# Patient Record
Sex: MALE | Race: WHITE | NOT HISPANIC OR LATINO | Employment: FULL TIME | URBAN - METROPOLITAN AREA
[De-identification: names, ages, dates, MRNs, and addresses within clinical notes are randomized per-mention and may not be internally consistent; named-entity substitution may affect disease eponyms.]

---

## 2020-03-16 ENCOUNTER — HOSPITAL ENCOUNTER (OUTPATIENT)
Dept: URGENT CARE | Facility: CLINIC | Age: 20
Discharge: HOME | End: 2020-03-16
Attending: FAMILY MEDICINE

## 2020-03-18 LAB — BACTERIA SPEC AEROBE CULT: NORMAL

## 2020-11-21 ENCOUNTER — HOSPITAL ENCOUNTER (OUTPATIENT)
Dept: URGENT CARE | Facility: CLINIC | Age: 20
Discharge: HOME | End: 2020-11-21
Attending: EMERGENCY MEDICINE

## 2020-11-24 LAB — BACTERIA SPEC AEROBE CULT: NORMAL

## 2020-11-25 LAB — SARS-COV-2 RNA SPEC QL NAA+PROBE: NOT DETECTED

## 2022-01-24 PROCEDURE — 86308 HETEROPHILE ANTIBODY SCREEN: CPT | Performed by: EMERGENCY MEDICINE

## 2023-05-25 PROCEDURE — 87081 CULTURE SCREEN ONLY: CPT | Performed by: NURSE PRACTITIONER

## 2023-08-02 ENCOUNTER — OFFICE VISIT (OUTPATIENT)
Dept: ORTHOPEDIC SURGERY | Facility: CLINIC | Age: 23
End: 2023-08-02
Payer: COMMERCIAL

## 2023-08-02 VITALS
SYSTOLIC BLOOD PRESSURE: 120 MMHG | DIASTOLIC BLOOD PRESSURE: 75 MMHG | WEIGHT: 230 LBS | BODY MASS INDEX: 26.61 KG/M2 | HEIGHT: 78 IN | HEART RATE: 70 BPM | OXYGEN SATURATION: 95 %

## 2023-08-02 DIAGNOSIS — S93.601D SPRAIN OF RIGHT FOOT, SUBSEQUENT ENCOUNTER: Primary | ICD-10-CM

## 2023-08-30 ENCOUNTER — OFFICE VISIT (OUTPATIENT)
Dept: ORTHOPEDIC SURGERY | Facility: CLINIC | Age: 23
End: 2023-08-30
Payer: COMMERCIAL

## 2023-08-30 VITALS
BODY MASS INDEX: 26.61 KG/M2 | DIASTOLIC BLOOD PRESSURE: 74 MMHG | OXYGEN SATURATION: 95 % | HEIGHT: 78 IN | WEIGHT: 230 LBS | SYSTOLIC BLOOD PRESSURE: 156 MMHG | HEART RATE: 75 BPM

## 2023-08-30 DIAGNOSIS — S93.601D SPRAIN OF RIGHT FOOT, SUBSEQUENT ENCOUNTER: Primary | ICD-10-CM

## 2023-08-30 DIAGNOSIS — M79.671 RIGHT FOOT PAIN: ICD-10-CM

## 2023-08-30 NOTE — PROGRESS NOTES
"Chief Complaint  Follow-up of the Right Foot    Subjective          Brady Krishnan presents to [facility]   History of Present Illness    Brady Krishnan presents today for a follow-up of his right foot.  Patient has a right foot sprain with initial injury 6/24/2023.  Today, patient states that he is doing okay.  He has continue with physical therapy.  He reports pain to his foot with ambulation.  The pain remains in the lateral portion.  He states that his foot does continue to gradually improve.  He reports good foot and ankle range of motion, does not feel like he has any limitations from range of motion.  He states that his swelling has been improving.  He denies new injuries.      No Known Allergies     Social History     Socioeconomic History    Marital status: Single   Tobacco Use    Smoking status: Never     Passive exposure: Never    Smokeless tobacco: Never   Vaping Use    Vaping Use: Some days   Substance and Sexual Activity    Alcohol use: Yes     Alcohol/week: 6.0 standard drinks     Types: 4 Cans of beer, 2 Shots of liquor per week     Comment: occ on weekends    Drug use: Never        I reviewed the patient's chief complaint, history of present illness, review of systems, past medical history, surgical history, family history, social history, medications, and allergy list.     REVIEW OF SYSTEMS    Constitutional: Denies fevers, chills, weight loss  Cardiovascular: Denies chest pain, shortness of breath  Skin: Denies rashes, acute skin changes  Neurologic: Denies headache, loss of consciousness  MSK: Right foot pain      Objective   Vital Signs:   /74   Pulse 75   Ht 203.2 cm (80\")   Wt 104 kg (230 lb)   SpO2 95%   BMI 25.27 kg/mý     Body mass index is 25.27 kg/mý.    Physical Exam    General: Alert. No acute distress.   Right lower extremity: Tenderness to palpation of the lateral foot.  No other areas of tenderness to the foot.  No swelling.  Calf soft, " nontender.  Achilles intact.  Nontender to medial lateral malleolus.  Demonstrates active ankle range of motion with no stiffness.  Slight discomfort with inversion testing.  Stiffness with inversion testing.  Toe range of motion intact.  Sensation intact over the dorsal and plantar foot.  Palpable pedal pulses.    Procedures    Imaging Results (Most Recent)       None                   Assessment and Plan    Diagnoses and all orders for this visit:    1. Sprain of right foot, subsequent encounter (Primary)    2. Right foot pain        Brady Krishnan presents today for follow-up of his right foot sprain with initial injury 6/24/2023.  Patient instructed to continue with formal physical therapy as well as his home exercises.  Continue using ice and elevation as needed for inflammation.  Continue with anti-inflammatories as needed.      Patient will follow up in 4 weeks for reevaluation.      Call or return if symptoms worsen or patient has any concerns.       Follow Up   Return in about 4 weeks (around 9/27/2023).  Patient was given instructions and counseling regarding his condition or for health maintenance advice. Please see specific information pulled into the AVS if appropriate.     Delia Mercedes PA-C  08/30/23  12:48 EDT

## 2024-06-13 ENCOUNTER — OFFICE VISIT (OUTPATIENT)
Dept: GASTROENTEROLOGY | Facility: CLINIC | Age: 24
End: 2024-06-13
Payer: COMMERCIAL

## 2024-06-13 VITALS
SYSTOLIC BLOOD PRESSURE: 120 MMHG | BODY MASS INDEX: 27.1 KG/M2 | HEART RATE: 68 BPM | HEIGHT: 78 IN | DIASTOLIC BLOOD PRESSURE: 67 MMHG | WEIGHT: 234.2 LBS

## 2024-06-13 DIAGNOSIS — R10.9 ABDOMINAL CRAMPING: ICD-10-CM

## 2024-06-13 DIAGNOSIS — R15.2 FECAL URGENCY: ICD-10-CM

## 2024-06-13 DIAGNOSIS — R10.13 DYSPEPSIA: ICD-10-CM

## 2024-06-13 DIAGNOSIS — R19.7 DIARRHEA, UNSPECIFIED TYPE: Primary | ICD-10-CM

## 2024-06-13 RX ORDER — FLUTICASONE PROPIONATE 50 MCG
2 SPRAY, SUSPENSION (ML) NASAL DAILY
COMMUNITY

## 2024-06-13 RX ORDER — LEVOCETIRIZINE DIHYDROCHLORIDE 5 MG/1
5 TABLET, FILM COATED ORAL EVERY EVENING
COMMUNITY

## 2024-06-13 NOTE — PROGRESS NOTES
Chief Complaint        Diarrhea    History of Present Illness      Brady Krishnan is a 24 y.o. male who presents to [facility] as a new patient for diarrhea.    He admits in 2023 he started to meal prep and get healthier. In June of 2023 he broke his foot and was unable to meal prep. That's when the diarrhea started becoming a problem. He admits he was eating out a lot. He admits about 3 months ago he started back with meal prepping. The diarrhea is still a problem especially with certain foods. He is having fecal urgency. He admits it has changed his life because he doesn't know when it will hit depending on what he eats. He denies any rectal bleeding or melena. Will have some stomach cramps. He does have a lot of heartburn. He doesn't take anything routine for it. He will sometimes have a solid stool. He is happy to report reflux is better when he eats clean. He does admit anxiety makes his symptoms worse.     Fecal occult blood test done at [facility] this past March was negative.  Celiac panel normal.  Fecal fat normal.    EGD/colonoscopy----never    GI family history----Maternal aunt with colon cancer.     Results       Result Review :   The following data was reviewed by: ARCHIE Javier on 06/13/2024       CBC          7/26/2023    11:27 1/2/2024    09:20   CBC   WBC 6.25     6.10       RBC 4.76     4.68       Hemoglobin 14.9     14.9       Hematocrit 43.2     43.6       MCV 90.8     93.2       MCH 31.3     31.8       MCHC 34.5     34.2       RDW 11.9     12.0       Platelets 198     213          Details          This result is from an external source.             CBC w/diff          7/26/2023    11:27 1/2/2024    09:20   CBC w/Diff   WBC 6.25     6.10       RBC 4.76     4.68       Hemoglobin 14.9     14.9       Hematocrit 43.2     43.6       MCV 90.8     93.2       MCH 31.3     31.8       MCHC 34.5     34.2       RDW 11.9     12.0       Platelets 198     213      "  Neutrophil Rel % 57.4     52.4       Immature Granulocyte Rel % 0.2     0.2       Lymphocyte Rel % 31.7     32.5       Monocyte Rel % 8.2     7.7       Eosinophil Rel % 1.9     6.4       Basophil Rel % 0.6     0.8          Details          This result is from an external source.                   Lipase No results found for: \"LIPASE\"  Amylase No results found for: \"AMYLASE\"  Iron Profile No results found for: \"IRON\", \"TIBC\", \"LABIRON\", \"TRANSFERRIN\"  Ferritin No results found for: \"FERRITIN\"         Past Medical History       Past Medical History:   Diagnosis Date    Allergies     Fracture, foot 06/23/2023       History reviewed. No pertinent surgical history.      Current Outpatient Medications:     fluticasone (FLONASE) 50 MCG/ACT nasal spray, 2 sprays into the nostril(s) as directed by provider Daily., Disp: , Rfl:     levocetirizine (XYZAL) 5 MG tablet, Take 1 tablet by mouth Every Evening., Disp: , Rfl:     ultra-purified peppermint oil (IBGARD) 90 mg capsule capsule, Take 2 capsules by mouth 3 (Three) Times a Day As Needed (prn)., Disp: 16 capsule, Rfl: 0     No Known Allergies    History reviewed. No pertinent family history.     Social History     Social History Narrative    Not on file       Objective       Objective     Vital Signs:   /67 (BP Location: Left arm, Patient Position: Sitting, Cuff Size: Adult)   Pulse 68   Ht 203.2 cm (80\")   Wt 106 kg (234 lb 3.2 oz)   BMI 25.73 kg/m²     Body mass index is 25.73 kg/m².    Review of Systems   Constitutional:  Negative for appetite change, chills, diaphoresis, fatigue, fever and unexpected weight change.   HENT:  Negative for nosebleeds, postnasal drip, sore throat, trouble swallowing and voice change.    Respiratory:  Negative for cough, choking, chest tightness, shortness of breath, wheezing and stridor.    Cardiovascular:  Negative for chest pain, palpitations and leg swelling.   Gastrointestinal:  Positive for abdominal distention and diarrhea. " Negative for abdominal pain, anal bleeding, blood in stool, constipation, nausea, rectal pain and vomiting.   Endocrine: Negative for polydipsia, polyphagia and polyuria.   Musculoskeletal:  Negative for gait problem.   Skin:  Negative for rash and wound.   Allergic/Immunologic: Negative for food allergies.   Neurological:  Negative for dizziness, speech difficulty and light-headedness.   Psychiatric/Behavioral:  Negative for confusion, self-injury, sleep disturbance and suicidal ideas.         Physical Exam  Constitutional:       General: He is not in acute distress.     Appearance: He is well-developed. He is not ill-appearing.   HENT:      Head: Normocephalic.   Eyes:      Pupils: Pupils are equal, round, and reactive to light.   Cardiovascular:      Rate and Rhythm: Normal rate and regular rhythm.      Heart sounds: Normal heart sounds.   Pulmonary:      Effort: Pulmonary effort is normal.      Breath sounds: Normal breath sounds.   Abdominal:      General: Bowel sounds are normal. There is no distension.      Palpations: Abdomen is soft. There is no mass.      Tenderness: There is no abdominal tenderness. There is no guarding or rebound.      Hernia: No hernia is present.   Musculoskeletal:         General: Normal range of motion.   Skin:     General: Skin is warm and dry.   Neurological:      Mental Status: He is alert and oriented to person, place, and time.   Psychiatric:         Speech: Speech normal.         Behavior: Behavior normal.         Judgment: Judgment normal.              Assessment & Plan          Assessment and Plan    Diagnoses and all orders for this visit:    1. Diarrhea, unspecified type (Primary)  -     Alpha-Gal IgE Panel; Future  -     Food Allergy Profile; Future  -     Lipase  -     US Gallbladder; Future  -     ultra-purified peppermint oil (IBGARD) 90 mg capsule capsule; Take 2 capsules by mouth 3 (Three) Times a Day As Needed (prn).  Dispense: 16 capsule; Refill: 0    2.  Dyspepsia  -     Alpha-Gal IgE Panel; Future  -     Food Allergy Profile; Future  -     Lipase  -     US Gallbladder; Future    3. Fecal urgency    4. Abdominal cramping  -     ultra-purified peppermint oil (IBGARD) 90 mg capsule capsule; Take 2 capsules by mouth 3 (Three) Times a Day As Needed (prn).  Dispense: 16 capsule; Refill: 0    Reviewed medical history with him today.  Not sure what all he has going on at this time.  It does sound like anxiety plays a part in it.  I am concerned about his gallbladder.  Will check gallbladder ultrasound.  Will check labs.  Will start him on IBgard.  Samples given today.  Continue bland diet.  Patient to call the office in 1 to 2 weeks with an update.  Patient to follow-up with me in 3 months.  Patient is agreeable to the plan.            Follow Up       Follow Up   Return in about 3 months (around 9/13/2024) for DIARRHEA, GERD.  Patient was given instructions and counseling regarding his condition or for health maintenance advice. Please see specific information pulled into the AVS if appropriate.

## 2024-06-20 ENCOUNTER — LAB (OUTPATIENT)
Dept: LAB | Facility: HOSPITAL | Age: 24
End: 2024-06-20
Payer: COMMERCIAL

## 2024-06-20 DIAGNOSIS — R19.7 DIARRHEA, UNSPECIFIED TYPE: ICD-10-CM

## 2024-06-20 DIAGNOSIS — R10.13 DYSPEPSIA: ICD-10-CM

## 2024-06-20 LAB — LIPASE SERPL-CCNC: 32 U/L (ref 13–60)

## 2024-06-20 PROCEDURE — 86003 ALLG SPEC IGE CRUDE XTRC EA: CPT

## 2024-06-20 PROCEDURE — 86008 ALLG SPEC IGE RECOMB EA: CPT

## 2024-06-20 PROCEDURE — 83690 ASSAY OF LIPASE: CPT | Performed by: NURSE PRACTITIONER

## 2024-06-20 PROCEDURE — 36415 COLL VENOUS BLD VENIPUNCTURE: CPT

## 2024-06-20 PROCEDURE — 82785 ASSAY OF IGE: CPT

## 2024-06-25 LAB
CLAM IGE QN: <0.1 KU/L
CODFISH IGE QN: <0.1 KU/L
CONV CLASS DESCRIPTION: ABNORMAL
CORN IGE QN: 1.08 KU/L
COW MILK IGE QN: <0.1 KU/L
EGG WHITE IGE QN: <0.1 KU/L
PEANUT IGE QN: 2.2 KU/L
SCALLOP IGE QN: <0.1 KU/L
SESAME SEED IGE QN: 2 KU/L
SHRIMP IGE QN: <0.1 KU/L
SOYBEAN IGE QN: 0.29 KU/L
WALNUT IGE QN: 0.66 KU/L
WHEAT IGE QN: 0.93 KU/L

## 2024-06-26 ENCOUNTER — TELEPHONE (OUTPATIENT)
Dept: GASTROENTEROLOGY | Facility: CLINIC | Age: 24
End: 2024-06-26
Payer: COMMERCIAL

## 2024-06-26 LAB
ALPHA-GAL IGE QN: <0.1 KU/L
BEEF IGE QN: <0.1 KU/L
CONV CLASS DESCRIPTION: NORMAL
IGE SERPL-ACNC: 36 IU/ML (ref 6–495)
LAMB IGE QN: <0.1 KU/L
PORK IGE QN: <0.1 KU/L

## 2024-06-26 NOTE — TELEPHONE ENCOUNTER
----- Message from Hyun Aguilar sent at 6/26/2024  1:12 PM EDT -----  Please call the patient and let him know he had multiple food allergies pop up on his food allergy profile.  Including peanuts, soy beans, walnuts, Wheat, corn and sesame seed.  Would highly recommend that he follow-up with an allergist for further testing.  Alpha gal was normal.

## 2024-07-10 ENCOUNTER — HOSPITAL ENCOUNTER (OUTPATIENT)
Dept: ULTRASOUND IMAGING | Facility: HOSPITAL | Age: 24
Discharge: HOME | End: 2024-07-10
Admitting: NURSE PRACTITIONER
Payer: COMMERCIAL

## 2024-07-10 DIAGNOSIS — R10.13 DYSPEPSIA: ICD-10-CM

## 2024-07-10 DIAGNOSIS — R19.7 DIARRHEA, UNSPECIFIED TYPE: ICD-10-CM

## 2024-07-10 PROCEDURE — 76705 ECHO EXAM OF ABDOMEN: CPT

## 2024-07-11 ENCOUNTER — TELEPHONE (OUTPATIENT)
Dept: GASTROENTEROLOGY | Facility: CLINIC | Age: 24
End: 2024-07-11
Payer: COMMERCIAL

## 2024-07-11 NOTE — TELEPHONE ENCOUNTER
----- Message from Hyun Aguilar sent at 7/11/2024  9:25 AM EDT -----  Gallbladder ultrasound was normal.

## 2024-07-12 NOTE — TELEPHONE ENCOUNTER
Patient was notified of his results and verbalized understanding. Patient stated that he will be out of town on original follow up appointment 9/18 and needed to reschedule. Patient was rescheduled to 10/2/2024 at 10am.

## 2024-11-11 ENCOUNTER — OFFICE VISIT (OUTPATIENT)
Dept: GASTROENTEROLOGY | Facility: CLINIC | Age: 24
End: 2024-11-11
Payer: COMMERCIAL

## 2024-11-11 VITALS
HEART RATE: 80 BPM | BODY MASS INDEX: 25.82 KG/M2 | DIASTOLIC BLOOD PRESSURE: 58 MMHG | WEIGHT: 223.2 LBS | HEIGHT: 78 IN | SYSTOLIC BLOOD PRESSURE: 132 MMHG

## 2024-11-11 DIAGNOSIS — R10.13 DYSPEPSIA: Primary | ICD-10-CM

## 2024-11-11 DIAGNOSIS — R10.30 LOWER ABDOMINAL PAIN: ICD-10-CM

## 2024-11-11 DIAGNOSIS — Z83.719 FH: COLON POLYPS: ICD-10-CM

## 2024-11-11 DIAGNOSIS — Z80.0 FH: COLON CANCER: ICD-10-CM

## 2024-11-11 PROCEDURE — 99214 OFFICE O/P EST MOD 30 MIN: CPT | Performed by: NURSE PRACTITIONER

## 2024-11-11 RX ORDER — SODIUM, POTASSIUM,MAG SULFATES 17.5-3.13G
2 SOLUTION, RECONSTITUTED, ORAL ORAL TAKE AS DIRECTED
Qty: 177 ML | Refills: 0 | Status: SHIPPED | OUTPATIENT
Start: 2024-11-11

## 2024-11-11 NOTE — PROGRESS NOTES
Chief Complaint   Heartburn and Diarrhea    History of Present Illness       Brady Krishnan is a 24 y.o. male who presents to [facility] for follow-up diarrhea.  He was last seen in the office by me on 6/13/2024.    He admits in 2023 he started to meal prep and get healthier. In June of 2023 he broke his foot and was unable to meal prep. That's when the diarrhea started becoming a problem. He admits he was eating out a lot. He admits about 3 months ago he started back with meal prepping. The diarrhea is still a problem especially with certain foods. He is having fecal urgency. He admits it has changed his life because he doesn't know when it will hit depending on what he eats. He denies any rectal bleeding or melena. Will have some stomach cramps. He does have a lot of heartburn. He doesn't take anything routine for it. He will sometimes have a solid stool. He is happy to report reflux is better when he eats clean. He does admit anxiety makes his symptoms worse.      Fecal occult blood test done at [facility] this past March was negative.  Celiac panel normal.  Fecal fat normal.     EGD/colonoscopy----never     GI family history----Maternal aunt with colon cancer. Mother colon polyps.     He had a gallbladder ultrasound done on 7/10/2024 that was completely normal.    Still having diarrhea. Will have solid stools but diarrhea comes and goes. He meal preps for the week and so far he's better avoiding soy. Has also changed protein powder and that has helped. Food allergy panel did show several allergies. Alpha gal normal. Celiac normal. Didn't see any relief from the IBGARD. Feels anxiety could be an issue.      Results       Result Review :         CBC          1/2/2024    09:20   CBC   WBC 6.10       RBC 4.68       Hemoglobin 14.9       Hematocrit 43.6       MCV 93.2       MCH 31.8       MCHC 34.2       RDW 12.0       Platelets 213          Details          This result is from an  "external source.             CBC w/diff          1/2/2024    09:20   CBC w/Diff   WBC 6.10       RBC 4.68       Hemoglobin 14.9       Hematocrit 43.6       MCV 93.2       MCH 31.8       MCHC 34.2       RDW 12.0       Platelets 213       Neutrophil Rel % 52.4       Immature Granulocyte Rel % 0.2       Lymphocyte Rel % 32.5       Monocyte Rel % 7.7       Eosinophil Rel % 6.4       Basophil Rel % 0.8          Details          This result is from an external source.                     Lipase   Lipase   Date Value Ref Range Status   06/20/2024 32 13 - 60 U/L Final     Amylase No results found for: \"AMYLASE\"  Iron Profile No results found for: \"IRON\", \"TIBC\", \"LABIRON\", \"TRANSFERRIN\"  Ferritin No results found for: \"FERRITIN\"  ESR (Sed Rate) No results found for: \"SEDRATE\"  CRP (C-Reactive) No results found for: \"CRP\"  Liver Workup No results found for: \"AFPTM\", \"DSDNA\", \"EXPANDEDENA\", \"SMOOTHMUSCAB\", \"CERULOPLSM\", \"FERRITIN\", \"LABIMMURE\", \"TOTIGGREF\", \"IGA\", \"IGM\", \"IRON\", \"TIBC\", \"LABIRON\", \"TRANSFERRIN\", \"MITOAB\", \"PROTIME\", \"INR\", \"AFP\"            Past Medical History       Past Medical History:   Diagnosis Date    Allergies     Fracture, foot 06/23/2023       History reviewed. No pertinent surgical history.      Current Outpatient Medications:     fluticasone (FLONASE) 50 MCG/ACT nasal spray, Administer 2 sprays into the nostril(s) as directed by provider Daily., Disp: , Rfl:     levocetirizine (XYZAL) 5 MG tablet, Take 1 tablet by mouth Every Evening., Disp: , Rfl:     sodium-potassium-magnesium sulfates (Suprep Bowel Prep Kit) 17.5-3.13-1.6 GM/177ML solution oral solution, Take 2 bottles by mouth Take As Directed., Disp: 177 mL, Rfl: 0     No Known Allergies    History reviewed. No pertinent family history.     Social History     Social History Narrative    Not on file       Objective       Review of Systems   Constitutional:  Negative for appetite change, fatigue, fever, unexpected weight gain and unexpected " "weight loss.   HENT:  Negative for trouble swallowing.    Respiratory:  Negative for cough, choking, chest tightness, shortness of breath, wheezing and stridor.    Cardiovascular:  Negative for chest pain, palpitations and leg swelling.   Gastrointestinal:  Positive for abdominal pain, diarrhea, nausea, GERD and indigestion. Negative for abdominal distention, anal bleeding, blood in stool, constipation, rectal pain and vomiting.        Vital Signs:   /58 (BP Location: Right arm, Patient Position: Sitting, Cuff Size: Adult)   Pulse 80   Ht 203.2 cm (80\")   Wt 101 kg (223 lb 3.2 oz)   BMI 24.52 kg/m²       Physical Exam  Constitutional:       General: He is not in acute distress.     Appearance: He is well-developed. He is not ill-appearing.   HENT:      Head: Normocephalic.   Eyes:      Pupils: Pupils are equal, round, and reactive to light.   Cardiovascular:      Rate and Rhythm: Normal rate and regular rhythm.      Heart sounds: Normal heart sounds.   Pulmonary:      Effort: Pulmonary effort is normal.      Breath sounds: Normal breath sounds.   Abdominal:      General: Bowel sounds are normal. There is no distension.      Palpations: Abdomen is soft. There is no mass.      Tenderness: There is no abdominal tenderness. There is no guarding or rebound.      Hernia: No hernia is present.   Musculoskeletal:         General: Normal range of motion.   Skin:     General: Skin is warm and dry.   Neurological:      Mental Status: He is alert and oriented to person, place, and time.   Psychiatric:         Speech: Speech normal.         Behavior: Behavior normal.         Judgment: Judgment normal.           Assessment & Plan          Assessment and Plan    Diagnoses and all orders for this visit:    1. Dyspepsia (Primary)  -     NM HIDA Scan With Pharmacological Intervention; Future  -     Case Request; Standing  -     Follow Anesthesia Guidelines / Protocol; Future  -     Case Request  -     " sodium-potassium-magnesium sulfates (Suprep Bowel Prep Kit) 17.5-3.13-1.6 GM/177ML solution oral solution; Take 2 bottles by mouth Take As Directed.  Dispense: 177 mL; Refill: 0    2. Lower abdominal pain  -     NM HIDA Scan With Pharmacological Intervention; Future  -     Case Request; Standing  -     Follow Anesthesia Guidelines / Protocol; Future  -     Case Request  -     sodium-potassium-magnesium sulfates (Suprep Bowel Prep Kit) 17.5-3.13-1.6 GM/177ML solution oral solution; Take 2 bottles by mouth Take As Directed.  Dispense: 177 mL; Refill: 0    3. FH: colon cancer  -     NM HIDA Scan With Pharmacological Intervention; Future  -     Case Request; Standing  -     Follow Anesthesia Guidelines / Protocol; Future  -     Case Request  -     sodium-potassium-magnesium sulfates (Suprep Bowel Prep Kit) 17.5-3.13-1.6 GM/177ML solution oral solution; Take 2 bottles by mouth Take As Directed.  Dispense: 177 mL; Refill: 0    4. FH: colon polyps  -     NM HIDA Scan With Pharmacological Intervention; Future  -     Case Request; Standing  -     Follow Anesthesia Guidelines / Protocol; Future  -     Case Request  -     sodium-potassium-magnesium sulfates (Suprep Bowel Prep Kit) 17.5-3.13-1.6 GM/177ML solution oral solution; Take 2 bottles by mouth Take As Directed.  Dispense: 177 mL; Refill: 0    Other orders  -     Verify NPO; Standing  -     Verify Bowel Prep Was Successful; Standing  -     Give Tap Water Enema If Bowel Prep Insufficient; Standing      Reviewed most recent lab and imaging results with him today.  Gallbladder ultrasound was normal.  Since he is continuing to have symptoms we will check a HIDA scan to fully evaluate his gallbladder.  Still having diarrhea.  Still having reflux symptoms with abdominal pain and cramping.  Given his history and current symptoms recommend EGD and colonoscopy with Dr. Mcdermott for further evaluation.  Patient is agreeable to the scopes.  No blood thinners, no clearances.   Suprep.  IBgard was not helpful.  Food allergy profile did show multiple food allergies.  Alpha gal was normal.  Patient to continue to avoid those food triggers.  Patient to call the office with any issues.  Patient to follow-up with me after his scopes.  Patient is agreeable to the plan.    Surgical Risk and Benefits discussed: Possible risks/complications, benefits, and alternatives to surgical or invasive procedure have been explained to patient and/or legal guardian; risks include bleeding, infection, and perforation. Patient has been evaluated and can tolerate anesthesia and/or sedation. Risks, benefits, and alternatives to anesthesia and sedation have been explained to patient and/or legal guardian.          Follow Up       Follow Up   Return for F/U AFTER PROCEDURE.  Patient was given instructions and counseling regarding his condition or for health maintenance advice. Please see specific information pulled into the AVS if appropriate.

## 2024-12-11 ENCOUNTER — HOSPITAL ENCOUNTER (OUTPATIENT)
Dept: NUCLEAR MEDICINE | Facility: HOSPITAL | Age: 24
Discharge: HOME | End: 2024-12-11
Payer: COMMERCIAL

## 2024-12-11 DIAGNOSIS — Z80.0 FH: COLON CANCER: ICD-10-CM

## 2024-12-11 DIAGNOSIS — R10.13 DYSPEPSIA: ICD-10-CM

## 2024-12-11 DIAGNOSIS — R10.30 LOWER ABDOMINAL PAIN: ICD-10-CM

## 2024-12-11 DIAGNOSIS — Z83.719 FH: COLON POLYPS: ICD-10-CM

## 2024-12-11 PROCEDURE — 34310000005 TECHNETIUM TC 99M MEBROFENIN KIT: Performed by: NURSE PRACTITIONER

## 2024-12-11 PROCEDURE — 78227 HEPATOBIL SYST IMAGE W/DRUG: CPT

## 2024-12-11 PROCEDURE — A9537 TC99M MEBROFENIN: HCPCS | Performed by: NURSE PRACTITIONER

## 2024-12-11 RX ORDER — KIT FOR THE PREPARATION OF TECHNETIUM TC 99M MEBROFENIN 45 MG/10ML
1 INJECTION, POWDER, LYOPHILIZED, FOR SOLUTION INTRAVENOUS
Status: COMPLETED | OUTPATIENT
Start: 2024-12-11 | End: 2024-12-11

## 2024-12-11 RX ADMIN — MEBROFENIN 1 DOSE: 45 INJECTION, POWDER, LYOPHILIZED, FOR SOLUTION INTRAVENOUS at 12:20

## 2024-12-12 ENCOUNTER — TELEPHONE (OUTPATIENT)
Dept: GASTROENTEROLOGY | Facility: CLINIC | Age: 24
End: 2024-12-12
Payer: COMMERCIAL

## 2024-12-13 NOTE — PRE-PROCEDURE INSTRUCTIONS
"PAT call attempted.  No answer.  Detailed message with date and arrival time of 1100 given.  Instructed that arrival time is not procedure time but allows time to prepare for procedure. Come to entrance \"C\"; must have adult  for transportation home; may have two visitors; however, children under 12 must remain in waiting area; instructed on diet/clear liquids/NPO/bowel prep, if needed; may take normal meds two hours prior to arrival time except for blood thinners, antidiabetics, diuretics, and weight loss meds.  Instructed to return call to confirm receipt of instructions and for any questions.  "

## 2024-12-18 ENCOUNTER — ANESTHESIA EVENT (OUTPATIENT)
Dept: GASTROENTEROLOGY | Facility: HOSPITAL | Age: 24
End: 2024-12-18
Payer: COMMERCIAL

## 2024-12-18 NOTE — ANESTHESIA PREPROCEDURE EVALUATION
Anesthesia Evaluation     Patient summary reviewed and Nursing notes reviewed   NPO Solid Status: > 8 hours  NPO Liquid Status: > 2 hours           Airway   Mallampati: I  TM distance: >3 FB  Neck ROM: full  No difficulty expected  Dental - normal exam     Pulmonary - normal exam    breath sounds clear to auscultation  Cardiovascular - normal exam  Exercise tolerance: good (4-7 METS)    Rhythm: regular  Rate: normal        Neuro/Psych  GI/Hepatic/Renal/Endo      Musculoskeletal     Abdominal    Substance History      OB/GYN          Other          Other Comment: SEASONAL ALLERGIES    ROS/Med Hx Other: Dyspepsia, lower abd pain     FAMILY HX COLON CA    No EKG on file                    Anesthesia Plan    ASA 1     general   total IV anesthesia  (Total IV Anesthesia    Patient understands anesthesia not responsible for dental damage.      Discussed risks with pt including aspiration, allergic reactions, apnea, advanced airway placement. Pt verbalized understanding. All questions answered.     )  intravenous induction     Anesthetic plan, risks, benefits, and alternatives have been provided, discussed and informed consent has been obtained with: patient.  Pre-procedure education provided  Plan discussed with CRNA.      CODE STATUS:

## 2024-12-19 ENCOUNTER — HOSPITAL ENCOUNTER (OUTPATIENT)
Facility: HOSPITAL | Age: 24
Setting detail: HOSPITAL OUTPATIENT SURGERY
Discharge: HOME | End: 2024-12-19
Attending: INTERNAL MEDICINE | Admitting: INTERNAL MEDICINE
Payer: COMMERCIAL

## 2024-12-19 ENCOUNTER — ANESTHESIA (OUTPATIENT)
Dept: GASTROENTEROLOGY | Facility: HOSPITAL | Age: 24
End: 2024-12-19
Payer: COMMERCIAL

## 2024-12-19 VITALS
BODY MASS INDEX: 23.3 KG/M2 | WEIGHT: 212.08 LBS | TEMPERATURE: 97.8 F | DIASTOLIC BLOOD PRESSURE: 42 MMHG | RESPIRATION RATE: 10 BRPM | OXYGEN SATURATION: 96 % | HEART RATE: 86 BPM | SYSTOLIC BLOOD PRESSURE: 110 MMHG

## 2024-12-19 DIAGNOSIS — Z83.719 FH: COLON POLYPS: ICD-10-CM

## 2024-12-19 DIAGNOSIS — R10.30 LOWER ABDOMINAL PAIN: ICD-10-CM

## 2024-12-19 DIAGNOSIS — Z80.0 FH: COLON CANCER: ICD-10-CM

## 2024-12-19 DIAGNOSIS — R10.13 DYSPEPSIA: ICD-10-CM

## 2024-12-19 PROCEDURE — 25810000003 LACTATED RINGERS PER 1000 ML

## 2024-12-19 PROCEDURE — 25010000002 LIDOCAINE PF 2% 2 % SOLUTION

## 2024-12-19 PROCEDURE — 25010000002 PROPOFOL 10 MG/ML EMULSION

## 2024-12-19 PROCEDURE — 88305 TISSUE EXAM BY PATHOLOGIST: CPT | Performed by: INTERNAL MEDICINE

## 2024-12-19 DEVICE — DEV CLIP ENDO RESOLUTION360 CONTRL ROT 235CM: Type: IMPLANTABLE DEVICE | Site: COLON | Status: FUNCTIONAL

## 2024-12-19 RX ORDER — LIDOCAINE HYDROCHLORIDE 20 MG/ML
INJECTION, SOLUTION EPIDURAL; INFILTRATION; INTRACAUDAL; PERINEURAL AS NEEDED
Status: DISCONTINUED | OUTPATIENT
Start: 2024-12-19 | End: 2024-12-19 | Stop reason: SURG

## 2024-12-19 RX ORDER — SODIUM CHLORIDE, SODIUM LACTATE, POTASSIUM CHLORIDE, CALCIUM CHLORIDE 600; 310; 30; 20 MG/100ML; MG/100ML; MG/100ML; MG/100ML
30 INJECTION, SOLUTION INTRAVENOUS CONTINUOUS
Status: DISCONTINUED | OUTPATIENT
Start: 2024-12-19 | End: 2024-12-19 | Stop reason: HOSPADM

## 2024-12-19 RX ORDER — PROPOFOL 10 MG/ML
VIAL (ML) INTRAVENOUS AS NEEDED
Status: DISCONTINUED | OUTPATIENT
Start: 2024-12-19 | End: 2024-12-19 | Stop reason: SURG

## 2024-12-19 RX ORDER — DEXMEDETOMIDINE HYDROCHLORIDE 100 UG/ML
INJECTION, SOLUTION INTRAVENOUS AS NEEDED
Status: DISCONTINUED | OUTPATIENT
Start: 2024-12-19 | End: 2024-12-19 | Stop reason: SURG

## 2024-12-19 RX ADMIN — PROPOFOL 50 MG: 10 INJECTION, EMULSION INTRAVENOUS at 13:03

## 2024-12-19 RX ADMIN — PROPOFOL 50 MG: 10 INJECTION, EMULSION INTRAVENOUS at 13:02

## 2024-12-19 RX ADMIN — PROPOFOL 175 MCG/KG/MIN: 10 INJECTION, EMULSION INTRAVENOUS at 13:00

## 2024-12-19 RX ADMIN — DEXMEDETOMIDINE HYDROCHLORIDE 15 MCG: 100 INJECTION, SOLUTION, CONCENTRATE INTRAVENOUS at 13:04

## 2024-12-19 RX ADMIN — LIDOCAINE HYDROCHLORIDE 50 MG: 20 INJECTION, SOLUTION INTRAVENOUS at 13:03

## 2024-12-19 RX ADMIN — PROPOFOL 120 MG: 10 INJECTION, EMULSION INTRAVENOUS at 13:00

## 2024-12-19 RX ADMIN — DEXMEDETOMIDINE HYDROCHLORIDE 15 MCG: 100 INJECTION, SOLUTION, CONCENTRATE INTRAVENOUS at 13:07

## 2024-12-19 RX ADMIN — PROPOFOL 40 MG: 10 INJECTION, EMULSION INTRAVENOUS at 13:21

## 2024-12-19 RX ADMIN — SODIUM CHLORIDE, POTASSIUM CHLORIDE, SODIUM LACTATE AND CALCIUM CHLORIDE 30 ML/HR: 600; 310; 30; 20 INJECTION, SOLUTION INTRAVENOUS at 13:53

## 2024-12-19 RX ADMIN — LIDOCAINE HYDROCHLORIDE 50 MG: 20 INJECTION, SOLUTION INTRAVENOUS at 13:00

## 2024-12-19 RX ADMIN — PROPOFOL 50 MG: 10 INJECTION, EMULSION INTRAVENOUS at 13:01

## 2024-12-19 NOTE — ANESTHESIA POSTPROCEDURE EVALUATION
Patient: Brady Krishnan    Procedure Summary       Date: 12/19/24 Room / Location: [facility] ENDOSCOPY 3 / [facility] ENDOSCOPY    Anesthesia Start: 1257 Anesthesia Stop: 1333    Procedures:       ESOPHAGOGASTRODUODENOSCOPY WITH BIOPSIES      COLONOSCOPY WITH BIOPSIES, WITH COLD SNARE POLYPECTOMY Diagnosis:       Dyspepsia      Lower abdominal pain      FH: colon cancer      FH: colon polyps      (Dyspepsia [R10.13])      (Lower abdominal pain [R10.30])      (FH: colon cancer [Z80.0])      (FH: colon polyps [Z83.719])    Surgeons: Isamar Mcdermott MD Provider: Jonatan García CRNA    Anesthesia Type: general ASA Status: 1            Anesthesia Type: general    Vitals  Vitals Value Taken Time   /42 12/19/24 1409   Temp 36.6 °C (97.8 °F) 12/19/24 1347   Pulse 84 12/19/24 1416   Resp 10 12/19/24 1407   SpO2 96 % 12/19/24 1416   Vitals shown include unfiled device data.        Post Anesthesia Care and Evaluation    Patient location during evaluation: bedside  Patient participation: complete - patient participated  Level of consciousness: awake  Pain management: adequate    Airway patency: patent  Anesthetic complications: No anesthetic complications  PONV Status: controlled  Cardiovascular status: acceptable and stable  Respiratory status: acceptable

## 2024-12-19 NOTE — ANESTHESIA POSTPROCEDURE EVALUATION
Patient: Brady Krishnan    Procedure Summary       Date: 12/19/24 Room / Location: [facility] ENDOSCOPY 3 / [facility] ENDOSCOPY    Anesthesia Start: 1257 Anesthesia Stop: 1333    Procedures:       ESOPHAGOGASTRODUODENOSCOPY WITH BIOPSIES      COLONOSCOPY WITH BIOPSIES, WITH COLD SNARE POLYPECTOMY Diagnosis:       Dyspepsia      Lower abdominal pain      FH: colon cancer      FH: colon polyps      (Dyspepsia [R10.13])      (Lower abdominal pain [R10.30])      (FH: colon cancer [Z80.0])      (FH: colon polyps [Z83.719])    Surgeons: Isamar Mcdermott MD Provider: Jonatan García CRNA    Anesthesia Type: general ASA Status: 1            Anesthesia Type: general    Vitals  Vitals Value Taken Time   BP 84/43 12/19/24 1347   Temp 36.6 °C (97.8 °F) 12/19/24 1347   Pulse 92 12/19/24 1347   Resp 17 12/19/24 1347   SpO2 94 % 12/19/24 1347           Post Anesthesia Care and Evaluation      Comments: Pt is hypovolemic in postop and remains hypotensive , will give 1 liter fluid bolus of LR - otherwise stable and other VS are within normal limits

## 2024-12-19 NOTE — H&P
Pre Procedure History & Physical    Chief Complaint:   Dyspepsia, lower abd pain, diarrhea    Subjective     HPI:   23 yo M here for eval of dyspepsia, lower abd pain, diarrhea.    Past Medical History:   Past Medical History:   Diagnosis Date    Allergies     Fracture, foot 06/23/2023       Past Surgical History:  History reviewed. No pertinent surgical history.    Family History:  History reviewed. No pertinent family history.    Social History:   reports that he has never smoked. He has never been exposed to tobacco smoke. He has never used smokeless tobacco. He reports that he does not currently use alcohol after a past usage of about 6.0 standard drinks of alcohol per week. He reports that he does not use drugs.    Medications:   Medications Prior to Admission   Medication Sig Dispense Refill Last Dose/Taking    fluticasone (FLONASE) 50 MCG/ACT nasal spray Administer 2 sprays into the nostril(s) as directed by provider Daily.       levocetirizine (XYZAL) 5 MG tablet Take 1 tablet by mouth Every Evening.          Allergies:  Patient has no known allergies.    ROS:    Pertinent items are noted in HPI     Objective     Blood pressure 135/93, pulse 110, temperature 98.6 °F (37 °C), temperature source Temporal, resp. rate 15, weight 96.2 kg (212 lb 1.3 oz), SpO2 98%.    Physical Exam   Constitutional: Pt is oriented to person, place, and time and well-developed, well-nourished, and in no distress.   Mouth/Throat: Oropharynx is clear and moist.   Neck: Normal range of motion.   Cardiovascular: Normal rate, regular rhythm and normal heart sounds.    Pulmonary/Chest: Effort normal and breath sounds normal.   Abdominal: Soft. Nontender  Skin: Skin is warm and dry.   Psychiatric: Mood, memory, affect and judgment normal.     Assessment & Plan     Diagnosis:  Dyspepsia, lower abd pain, diarrhea    Anticipated Surgical Procedure:  EGD/colonoscopy    The risks, benefits, and alternatives of this procedure have been  discussed with the patient or the responsible party- the patient understands and agrees to proceed.

## 2024-12-23 LAB
CYTO UR: NORMAL
LAB AP CASE REPORT: NORMAL
LAB AP CLINICAL INFORMATION: NORMAL
PATH REPORT.FINAL DX SPEC: NORMAL
PATH REPORT.GROSS SPEC: NORMAL

## 2024-12-23 RX ORDER — PANTOPRAZOLE SODIUM 20 MG/1
20 TABLET, DELAYED RELEASE ORAL DAILY
Qty: 90 TABLET | Refills: 1 | Status: SHIPPED | OUTPATIENT
Start: 2024-12-23

## 2024-12-27 ENCOUNTER — TELEPHONE (OUTPATIENT)
Dept: GASTROENTEROLOGY | Facility: CLINIC | Age: 24
End: 2024-12-27
Payer: COMMERCIAL

## 2024-12-27 NOTE — TELEPHONE ENCOUNTER
Attempted to contact patient, left voicemail message to return call. Provided direct contact information.    Reminded of follow up with ARCHIE Suh on 01.23.25 at 1345.    Care Gap updated:  5 year colon recall placed

## 2024-12-27 NOTE — TELEPHONE ENCOUNTER
----- Message from Maria Teresa Paige sent at 12/23/2024 12:05 PM EST -----  Esophageal biopsies are consistent with eosinophilic esophagitis. This is often referred to as EOE and remains that there are eosinophils or allergy cells in the esophagus. This can cause inflammation and difficulty in swallowing. Recommend PPI (protonix sent).  Keep scheduled follow-up.  Colon polyp benign.  Place in recall for repeat colonoscopy in 5 years.

## 2024-12-27 NOTE — TELEPHONE ENCOUNTER
Patient returned call:    Spoke to patient and informed of ARCHIE Suh result note and recommendations. Verified patient understanding.    Confirmed patient is taking pantoprazole. Educated on why and how best to take PPI medications.      Educated on lifestyle modifications to help decrease acid reflux including:  losing weight, avoiding trigger foods, avoid laying down within 3 hours of eating and elevating HOB.    Confirmed follow up appointment with Hyun Aguilar on 01.23.25 at 1345.    Advised patient to reach out to our office with any GI needs.

## 2025-01-23 ENCOUNTER — OFFICE VISIT (OUTPATIENT)
Dept: GASTROENTEROLOGY | Facility: CLINIC | Age: 25
End: 2025-01-23
Payer: COMMERCIAL

## 2025-01-23 VITALS
BODY MASS INDEX: 25.92 KG/M2 | DIASTOLIC BLOOD PRESSURE: 66 MMHG | SYSTOLIC BLOOD PRESSURE: 126 MMHG | HEART RATE: 90 BPM | HEIGHT: 78 IN | WEIGHT: 224 LBS

## 2025-01-23 DIAGNOSIS — K59.1 FUNCTIONAL DIARRHEA: ICD-10-CM

## 2025-01-23 DIAGNOSIS — Z91.018 MULTIPLE FOOD ALLERGIES: ICD-10-CM

## 2025-01-23 DIAGNOSIS — K21.00 GASTROESOPHAGEAL REFLUX DISEASE WITH ESOPHAGITIS WITHOUT HEMORRHAGE: ICD-10-CM

## 2025-01-23 DIAGNOSIS — K20.0 ESOPHAGITIS, EOSINOPHILIC: Primary | ICD-10-CM

## 2025-01-23 RX ORDER — PANTOPRAZOLE SODIUM 40 MG/1
40 TABLET, DELAYED RELEASE ORAL DAILY
Qty: 30 TABLET | Refills: 1 | Status: SHIPPED | OUTPATIENT
Start: 2025-01-23

## 2025-01-23 NOTE — PROGRESS NOTES
Chief Complaint   Abdominal Pain and EOE    History of Present Illness       Brady Krishnan is a 24 y.o. male who presents to [facility] GASTROENTEROLOGY for follow-up for EOE.  He was last seen in the office by me on 11/11/2024.    He admits in 2023 he started to meal prep and get healthier. In June of 2023 he broke his foot and was unable to meal prep. That's when the diarrhea started becoming a problem. He admits he was eating out a lot. He admits about 3 months ago he started back with meal prepping. The diarrhea is still a problem especially with certain foods. He is having fecal urgency. He admits it has changed his life because he doesn't know when it will hit depending on what he eats. He denies any rectal bleeding or melena. Will have some stomach cramps. He does have a lot of heartburn. He doesn't take anything routine for it. He will sometimes have a solid stool. He is happy to report reflux is better when he eats clean. He does admit anxiety makes his symptoms worse.      Fecal occult blood test done at [facility] this past March was negative.  Celiac panel normal.  Fecal fat normal.     EGD/colonoscopy----never     GI family history----Maternal aunt with colon cancer. Mother colon polyps.      He had a gallbladder ultrasound done on 7/10/2024 that was completely normal.     Still having diarrhea. Will have solid stools but diarrhea comes and goes. He meal preps for the week and so far he's better avoiding soy. Has also changed protein powder and that has helped. Food allergy panel did show several allergies. Alpha gal normal. Celiac normal. Didn't see any relief from the IBGARD. Feels anxiety could be an issue.     Food allergy profile did show multiple food allergies.  Alpha gal was normal.      He underwent EGD and colonoscopy with Dr. Mcdermott on 12/19/2024.  EGD showed esophageal mucosal changes.  Normal stomach.  Colonoscopy showed normal ileum.  6 mm ascending colon polyp was  "removed.  Repeat colonoscopy in 5 years.  Path positive for eosinophilic esophagitis. Colon polyp benign.    He is happy to report the reflux symptoms are better since taking protonix 20 mg daily. He is trying to avoid certain foods due to his food allergies. He does feel like he's 50-60% better. HIDA scan was normal.     Still having diarrhea. Worse with anxiety. Better with dietary changes.   Results       Result Review :                   Lipase   Lipase   Date Value Ref Range Status   06/20/2024 32 13 - 60 U/L Final     Amylase No results found for: \"AMYLASE\"  Iron Profile No results found for: \"IRON\", \"TIBC\", \"LABIRON\", \"TRANSFERRIN\"  Ferritin No results found for: \"FERRITIN\"  ESR (Sed Rate) No results found for: \"SEDRATE\"  CRP (C-Reactive) No results found for: \"CRP\"            Past Medical History       Past Medical History:   Diagnosis Date    Allergies     Fracture, foot 06/23/2023       Past Surgical History:   Procedure Laterality Date    COLONOSCOPY N/A 12/19/2024    Procedure: COLONOSCOPY WITH BIOPSIES, WITH COLD SNARE POLYPECTOMY;  Surgeon: Isamar Mcdermott MD;  Location: [facility] ENDOSCOPY;  Service: Gastroenterology;  Laterality: N/A;  COLON POLYP    ENDOSCOPY N/A 12/19/2024    Procedure: ESOPHAGOGASTRODUODENOSCOPY WITH BIOPSIES;  Surgeon: Isamar Mcdermott MD;  Location: [facility] ENDOSCOPY;  Service: Gastroenterology;  Laterality: N/A;  ESOPHAGITIS    UPPER GASTROINTESTINAL ENDOSCOPY  12/19/2024         Current Outpatient Medications:     fluticasone (FLONASE) 50 MCG/ACT nasal spray, Administer 2 sprays into the nostril(s) as directed by provider Daily., Disp: , Rfl:     levocetirizine (XYZAL) 5 MG tablet, Take 1 tablet by mouth Every Evening., Disp: , Rfl:     pantoprazole (Protonix) 40 MG EC tablet, Take 1 tablet by mouth Daily., Disp: 30 tablet, Rfl: 1     No Known Allergies    Family History   Problem Relation Age of Onset    Colon cancer Maternal Aunt         Social History     Social " "History Narrative    Not on file       Objective       Review of Systems   Constitutional:  Negative for appetite change, fatigue, fever, unexpected weight gain and unexpected weight loss.   HENT:  Negative for trouble swallowing.    Respiratory:  Negative for cough, choking, chest tightness, shortness of breath, wheezing and stridor.    Cardiovascular:  Negative for chest pain, palpitations and leg swelling.   Gastrointestinal:  Positive for diarrhea, GERD and indigestion. Negative for abdominal distention, abdominal pain, anal bleeding, blood in stool, constipation, nausea, rectal pain and vomiting.        Vital Signs:   /66 (BP Location: Left arm, Patient Position: Sitting, Cuff Size: Adult)   Pulse 90   Ht 203.2 cm (80\")   Wt 102 kg (224 lb)   BMI 24.61 kg/m²       Physical Exam  Constitutional:       General: He is not in acute distress.     Appearance: He is well-developed. He is not ill-appearing.   HENT:      Head: Normocephalic.   Eyes:      Pupils: Pupils are equal, round, and reactive to light.   Cardiovascular:      Rate and Rhythm: Normal rate and regular rhythm.      Heart sounds: Normal heart sounds.   Pulmonary:      Effort: Pulmonary effort is normal.      Breath sounds: Normal breath sounds.   Abdominal:      General: Bowel sounds are normal. There is no distension.      Palpations: Abdomen is soft. There is no mass.      Tenderness: There is no abdominal tenderness. There is no guarding or rebound.      Hernia: No hernia is present.   Musculoskeletal:         General: Normal range of motion.   Skin:     General: Skin is warm and dry.   Neurological:      Mental Status: He is alert and oriented to person, place, and time.   Psychiatric:         Speech: Speech normal.         Behavior: Behavior normal.         Judgment: Judgment normal.           Assessment & Plan          Assessment and Plan    Diagnoses and all orders for this visit:    1. Esophagitis, eosinophilic (Primary)  -     " pantoprazole (Protonix) 40 MG EC tablet; Take 1 tablet by mouth Daily.  Dispense: 30 tablet; Refill: 1    2. Gastroesophageal reflux disease with esophagitis without hemorrhage    3. Functional diarrhea    4. Multiple food allergies    Viewed EGD results with him today.  EOE/GERD is definitely better on pantoprazole 20 mg daily.  Still having some breakthrough issues.  Will increase it to 40 mg daily and see how he does.  Still having diarrhea episodes.  Recommend Imodium OTC as needed prophylactically.  Continue GERD precautions.  Continue to avoid all food allergy food and triggers.  HIDA scan was normal.  Patient to call the office in 2 weeks with an update.  Patient to follow-up with me in 3 months.  Patient agreeable to the plan.            Follow Up       Follow Up   Return in about 3 months (around 4/23/2025) for EOE, GERD, DIARRHEA.  Patient was given instructions and counseling regarding his condition or for health maintenance advice. Please see specific information pulled into the AVS if appropriate.

## 2025-03-31 DIAGNOSIS — K20.0 ESOPHAGITIS, EOSINOPHILIC: ICD-10-CM

## 2025-03-31 RX ORDER — PANTOPRAZOLE SODIUM 40 MG/1
40 TABLET, DELAYED RELEASE ORAL DAILY
Qty: 30 TABLET | Refills: 1 | Status: SHIPPED | OUTPATIENT
Start: 2025-03-31

## 2025-03-31 NOTE — TELEPHONE ENCOUNTER
Medication Requested Pantoprazole 40 mg     Last Refill 1/23/2025    Last OV 1/23/2025    Next OV 4/24/2025    Medication pended for approval and correct pharmacy verified Yes

## 2025-04-24 ENCOUNTER — OFFICE VISIT (OUTPATIENT)
Dept: GASTROENTEROLOGY | Facility: CLINIC | Age: 25
End: 2025-04-24
Payer: COMMERCIAL

## 2025-04-24 VITALS
SYSTOLIC BLOOD PRESSURE: 101 MMHG | HEART RATE: 71 BPM | WEIGHT: 222 LBS | BODY MASS INDEX: 25.69 KG/M2 | DIASTOLIC BLOOD PRESSURE: 61 MMHG | HEIGHT: 78 IN

## 2025-04-24 DIAGNOSIS — Z91.018 MULTIPLE FOOD ALLERGIES: ICD-10-CM

## 2025-04-24 DIAGNOSIS — Z86.0100 HISTORY OF COLON POLYPS: ICD-10-CM

## 2025-04-24 DIAGNOSIS — K20.0 ESOPHAGITIS, EOSINOPHILIC: Primary | ICD-10-CM

## 2025-04-24 DIAGNOSIS — K59.1 FUNCTIONAL DIARRHEA: ICD-10-CM

## 2025-04-24 DIAGNOSIS — K21.00 GASTROESOPHAGEAL REFLUX DISEASE WITH ESOPHAGITIS WITHOUT HEMORRHAGE: ICD-10-CM

## 2025-04-24 RX ORDER — PANTOPRAZOLE SODIUM 40 MG/1
40 TABLET, DELAYED RELEASE ORAL DAILY
Qty: 30 TABLET | Refills: 3 | Status: SHIPPED | OUTPATIENT
Start: 2025-04-24

## 2025-04-24 NOTE — PROGRESS NOTES
Chief Complaint   EOE, Diarrhea, and Heartburn    History of Present Illness       Brady Krishnan is a 24 y.o. male who presents to [facility] GASTROENTEROLOGY for follow-up for EOE.  He was last seen in the office by me on 1/23/2025.    He admits in 2023 he started to meal prep and get healthier. In June of 2023 he broke his foot and was unable to meal prep. That's when the diarrhea started becoming a problem. He admits he was eating out a lot. He admits about 3 months ago he started back with meal prepping. The diarrhea is still a problem especially with certain foods. He is having fecal urgency. He admits it has changed his life because he doesn't know when it will hit depending on what he eats. He denies any rectal bleeding or melena. Will have some stomach cramps. He does have a lot of heartburn. He doesn't take anything routine for it. He will sometimes have a solid stool. He is happy to report reflux is better when he eats clean. He does admit anxiety makes his symptoms worse.      Fecal occult blood test done at [facility] this past March was negative.  Celiac panel normal.  Fecal fat normal.     EGD/colonoscopy----never     GI family history----Maternal aunt with colon cancer. Mother colon polyps.      He had a gallbladder ultrasound done on 7/10/2024 that was completely normal.     Food allergy profile did show multiple food allergies.  Alpha gal was normal.       He underwent EGD and colonoscopy with Dr. Mcdermott on 12/19/2024.  EGD showed esophageal mucosal changes.  Normal stomach.  Colonoscopy showed normal ileum.  6 mm ascending colon polyp was removed.  Repeat colonoscopy in 5 years.  Path positive for eosinophilic esophagitis. Colon polyp benign.     HIDA scan was normal.  EOE/GERD is better with protonix 40 mg daily. Denies any recent choking. Still having some diarrhea several times a week. Worse with fried foods.   Results       Result Review :                   Lipase   Lipase  "  Date Value Ref Range Status   06/20/2024 32 13 - 60 U/L Final     Amylase No results found for: \"AMYLASE\"  Iron Profile No results found for: \"IRON\", \"TIBC\", \"LABIRON\", \"TRANSFERRIN\"  Ferritin No results found for: \"FERRITIN\"  ESR (Sed Rate) No results found for: \"SEDRATE\"  CRP (C-Reactive) No results found for: \"CRP\"  Liver Workup No results found for: \"AFPTM\", \"DSDNA\", \"EXPANDEDENA\", \"SMOOTHMUSCAB\", \"CERULOPLSM\", \"FERRITIN\", \"LABIMMURE\", \"TOTIGGREF\", \"IGA\", \"IGM\", \"IRON\", \"TIBC\", \"LABIRON\", \"TRANSFERRIN\", \"MITOAB\", \"PROTIME\", \"INR\", \"AFP\"            Past Medical History       Past Medical History:   Diagnosis Date    Allergies     Fracture, foot 06/23/2023       Past Surgical History:   Procedure Laterality Date    COLONOSCOPY N/A 12/19/2024    Procedure: COLONOSCOPY WITH BIOPSIES, WITH COLD SNARE POLYPECTOMY;  Surgeon: Isamar Mcdermott MD;  Location: [facility] ENDOSCOPY;  Service: Gastroenterology;  Laterality: N/A;  COLON POLYP    ENDOSCOPY N/A 12/19/2024    Procedure: ESOPHAGOGASTRODUODENOSCOPY WITH BIOPSIES;  Surgeon: Isamar Mcdermott MD;  Location: [facility] ENDOSCOPY;  Service: Gastroenterology;  Laterality: N/A;  ESOPHAGITIS    UPPER GASTROINTESTINAL ENDOSCOPY  12/19/2024         Current Outpatient Medications:     fluticasone (FLONASE) 50 MCG/ACT nasal spray, Administer 2 sprays into the nostril(s) as directed by provider Daily., Disp: , Rfl:     levocetirizine (XYZAL) 5 MG tablet, Take 1 tablet by mouth Every Evening., Disp: , Rfl:     pantoprazole (Protonix) 40 MG EC tablet, Take 1 tablet by mouth Daily., Disp: 30 tablet, Rfl: 3     No Known Allergies    Family History   Problem Relation Age of Onset    Colon cancer Maternal Aunt         Social History     Social History Narrative    Not on file       Objective       Review of Systems   Constitutional:  Negative for appetite change, fatigue, fever, unexpected weight gain and unexpected weight loss.   HENT:  Negative for trouble swallowing.  " "  Respiratory:  Negative for cough, choking, chest tightness, shortness of breath, wheezing and stridor.    Cardiovascular:  Negative for chest pain, palpitations and leg swelling.   Gastrointestinal:  Positive for diarrhea. Negative for abdominal distention, abdominal pain, anal bleeding, blood in stool, constipation, nausea, rectal pain, vomiting, GERD and indigestion.        Vital Signs:   /61 (BP Location: Left arm, Patient Position: Sitting, Cuff Size: Adult)   Pulse 71   Ht 203.2 cm (80\")   Wt 101 kg (222 lb)   BMI 24.39 kg/m²       Physical Exam  Constitutional:       General: He is not in acute distress.     Appearance: He is well-developed. He is not ill-appearing.   HENT:      Head: Normocephalic.   Eyes:      Pupils: Pupils are equal, round, and reactive to light.   Cardiovascular:      Rate and Rhythm: Normal rate and regular rhythm.      Heart sounds: Normal heart sounds.   Pulmonary:      Effort: Pulmonary effort is normal.      Breath sounds: Normal breath sounds.   Abdominal:      General: Bowel sounds are normal. There is no distension.      Palpations: Abdomen is soft. There is no mass.      Tenderness: There is no abdominal tenderness. There is no guarding or rebound.      Hernia: No hernia is present.   Musculoskeletal:         General: Normal range of motion.   Skin:     General: Skin is warm and dry.   Neurological:      Mental Status: He is alert and oriented to person, place, and time.   Psychiatric:         Speech: Speech normal.         Behavior: Behavior normal.         Judgment: Judgment normal.           Assessment & Plan          Assessment and Plan    Diagnoses and all orders for this visit:    1. Esophagitis, eosinophilic (Primary)  -     pantoprazole (Protonix) 40 MG EC tablet; Take 1 tablet by mouth Daily.  Dispense: 30 tablet; Refill: 3    2. Gastroesophageal reflux disease with esophagitis without hemorrhage    3. Functional diarrhea    4. Multiple food allergies    5. " History of colon polyps    EOE/GERD seems well-controlled on pantoprazole 40 mg daily.  Okay to go to every other day.  Continue GERD precautions.  Continue to avoid multiple food allergies.  Bowels moving well currently.  Okay to use Imodium OTC as needed for diarrhea.  Seems like diarrhea is worse with greasy fried foods.  Recommend he avoid those when possible.  Patient to call the office with any issues.  Patient to follow-up with me in 6 months.  Patient is agreeable to the plan.            Follow Up       Follow Up   Return in about 6 months (around 10/24/2025) for EOE, GERD, DIARRHEA.  Patient was given instructions and counseling regarding his condition or for health maintenance advice. Please see specific information pulled into the AVS if appropriate.

## 2025-07-07 DIAGNOSIS — K20.0 ESOPHAGITIS, EOSINOPHILIC: ICD-10-CM

## 2025-07-08 RX ORDER — PANTOPRAZOLE SODIUM 40 MG/1
40 TABLET, DELAYED RELEASE ORAL DAILY
Qty: 30 TABLET | Refills: 3 | Status: SHIPPED | OUTPATIENT
Start: 2025-07-08

## 2025-07-08 NOTE — TELEPHONE ENCOUNTER
Medication Requested Pantoprazole 40    Last Refill 4/24/2025    Last OV 4/24/2025    Next OV 10/30/2025    Medication pended for approval and correct pharmacy verified Yes

## (undated) DEVICE — Device: Brand: DEFENDO AIR/WATER/SUCTION AND BIOPSY VALVE

## (undated) DEVICE — SOL IRRG H2O PL/BG 1000ML STRL

## (undated) DEVICE — SINGLE-USE BIOPSY FORCEPS: Brand: RADIAL JAW 4

## (undated) DEVICE — Device

## (undated) DEVICE — BLCK/BITE BLOX WO/DENTL/RIM W/STRAP 54F

## (undated) DEVICE — SOLIDIFIER LIQLOC PLS 1500CC BT

## (undated) DEVICE — LINER SURG CANSTR SXN S/RIGD 1500CC

## (undated) DEVICE — CONN JET HYDRA H20 AUXILIARY DISP